# Patient Record
Sex: MALE | Race: WHITE | NOT HISPANIC OR LATINO | Employment: OTHER | URBAN - METROPOLITAN AREA
[De-identification: names, ages, dates, MRNs, and addresses within clinical notes are randomized per-mention and may not be internally consistent; named-entity substitution may affect disease eponyms.]

---

## 2023-10-06 ENCOUNTER — TELEPHONE (OUTPATIENT)
Dept: PRIMARY CARE | Facility: CLINIC | Age: 78
End: 2023-10-06

## 2023-10-13 ENCOUNTER — OFFICE VISIT (OUTPATIENT)
Dept: PRIMARY CARE | Facility: CLINIC | Age: 78
End: 2023-10-13
Payer: MEDICARE

## 2023-10-13 DIAGNOSIS — Z00.00 MEDICARE ANNUAL WELLNESS VISIT, SUBSEQUENT: Primary | ICD-10-CM

## 2023-10-13 DIAGNOSIS — M54.32 SCIATICA OF LEFT SIDE: ICD-10-CM

## 2023-10-13 DIAGNOSIS — R53.83 FATIGUE, UNSPECIFIED TYPE: ICD-10-CM

## 2023-10-13 DIAGNOSIS — E78.2 MIXED HYPERLIPIDEMIA: ICD-10-CM

## 2023-10-13 DIAGNOSIS — I73.9 INTERMITTENT CLAUDICATION (CMS-HCC): ICD-10-CM

## 2023-10-13 DIAGNOSIS — J42 CHRONIC BRONCHITIS, UNSPECIFIED CHRONIC BRONCHITIS TYPE (MULTI): ICD-10-CM

## 2023-10-13 DIAGNOSIS — Z12.5 PROSTATE CANCER SCREENING: ICD-10-CM

## 2023-10-13 PROCEDURE — G0439 PPPS, SUBSEQ VISIT: HCPCS | Performed by: INTERNAL MEDICINE

## 2023-10-13 PROCEDURE — 99397 PER PM REEVAL EST PAT 65+ YR: CPT | Performed by: INTERNAL MEDICINE

## 2023-10-13 PROCEDURE — 1159F MED LIST DOCD IN RCRD: CPT | Performed by: INTERNAL MEDICINE

## 2023-10-13 PROCEDURE — 1160F RVW MEDS BY RX/DR IN RCRD: CPT | Performed by: INTERNAL MEDICINE

## 2023-10-13 RX ORDER — ATORVASTATIN CALCIUM 80 MG/1
40 TABLET, FILM COATED ORAL NIGHTLY
COMMUNITY
Start: 2021-02-01 | End: 2024-01-08 | Stop reason: WASHOUT

## 2023-10-13 RX ORDER — ATORVASTATIN CALCIUM 40 MG/1
40 TABLET, FILM COATED ORAL DAILY
COMMUNITY
Start: 2023-09-15 | End: 2024-02-21 | Stop reason: SDUPTHER

## 2023-10-13 RX ORDER — GABAPENTIN 300 MG/1
300 CAPSULE ORAL 2 TIMES DAILY
COMMUNITY
Start: 2019-09-09 | End: 2024-02-07 | Stop reason: SDUPTHER

## 2023-10-13 RX ORDER — TRAZODONE HYDROCHLORIDE 50 MG/1
50 TABLET ORAL NIGHTLY
COMMUNITY
Start: 2023-04-04 | End: 2024-02-21 | Stop reason: WASHOUT

## 2023-10-13 RX ORDER — PREDNISONE 20 MG/1
40 TABLET ORAL DAILY
Qty: 10 TABLET | Refills: 0 | Status: SHIPPED | OUTPATIENT
Start: 2023-10-13 | End: 2023-10-18

## 2023-10-17 ENCOUNTER — OFFICE VISIT (OUTPATIENT)
Dept: OTOLARYNGOLOGY | Facility: CLINIC | Age: 78
End: 2023-10-17
Payer: MEDICARE

## 2023-10-17 ENCOUNTER — CLINICAL SUPPORT (OUTPATIENT)
Dept: AUDIOLOGY | Facility: CLINIC | Age: 78
End: 2023-10-17
Payer: MEDICARE

## 2023-10-17 VITALS — BODY MASS INDEX: 25.48 KG/M2 | WEIGHT: 182 LBS | HEIGHT: 71 IN

## 2023-10-17 DIAGNOSIS — H90.3 BILATERAL SENSORINEURAL HEARING LOSS: Primary | ICD-10-CM

## 2023-10-17 DIAGNOSIS — J31.0 CHRONIC RHINITIS: ICD-10-CM

## 2023-10-17 PROCEDURE — 92557 COMPREHENSIVE HEARING TEST: CPT | Performed by: AUDIOLOGIST

## 2023-10-17 PROCEDURE — 99203 OFFICE O/P NEW LOW 30 MIN: CPT | Performed by: OTOLARYNGOLOGY

## 2023-10-17 PROCEDURE — 1159F MED LIST DOCD IN RCRD: CPT | Performed by: OTOLARYNGOLOGY

## 2023-10-17 PROCEDURE — 92567 TYMPANOMETRY: CPT | Performed by: AUDIOLOGIST

## 2023-10-17 PROCEDURE — 1160F RVW MEDS BY RX/DR IN RCRD: CPT | Performed by: OTOLARYNGOLOGY

## 2023-10-17 NOTE — PROGRESS NOTES
Mr. Mckeon was seen today for a hearing evaluation during Dr. Sevilla's clinic.  He has a history of bilateral hearing loss which he reports has been worsening over the last 20-30 years.  He also has a history of occupational noise exposure secondary to his work as a .   He reports significant difficulty understanding speech.    Results:  Otoscopy revealed clear ear canals and tympanic membranes were visualized bilaterally.  Tympanometry revealed normal, Type A tympanograms, indicating normal ear canal volume, peak pressure and compliance bilaterally.   Audiometric thresholds revealed a slight sloping to profound sensorineural hearing loss in his left ear and a slight sloping to severe sensorineural hearing loss in his right ear.  Word recognition scores were fair at 64% bilaterally.    Recommendations:  Follow-up with PCP, Dr. Magana, as medically directed.  Follow-up with ENT, Dr. Sevilla, as medically directed.  Binaural amplification.  Retest hearing annually to monitor.

## 2023-10-17 NOTE — PROGRESS NOTES
Subjective   Patient ID: Mo Mckeon is a 78 y.o. male who presents for Hearing Loss.    HPI  Patient has a longstanding history of hearing loss and initially was considering hearing aids but told they were outlandishly expensive and at a price that he could not afford.  He has been therefore dragging his feet and avoiding them.  It is gotten to the point however he needs to do something.  Additionally, he complains of chronic rhinorrhea with evident chronic rhinitis symptomatology related to same with some congestion.  All remaining ENT inquiry is clear.      Review of Systems   All other systems reviewed and are negative.          Physical Exam  General appearance: No acute distress. Normal facies. Symmetric facial movement. No gross lesions of the face are noted.  The external ear structures appear normal. The ear canals patent and the tympanic membranes are intact without evidence of air-fluid levels, retraction, or congenital defects.  Nasal exam clear rhinorrhea noted bilaterally with mild congestion.  No gross polyps seen.  The tongue is normally mobile. There are no lesions on the gingiva, buccal, or oral mucosa. There are no oral cavity masses.  The neck is negative for mass lymphadenopathy. The trachea and parotid are clear. The thyroid bed is grossly unremarkable. The salivary gland structures are grossly unremarkable.    Assessment/Plan   1.  Bilateral sensorineural hearing loss moderately severe in nature with word discriminate 64% bilateral.  Great candidate for trial of hearing aid amplification and will work with Dr. Elmore and audiology for same.  2.  Chronic rhinitis.  We will start him on Nasacort with appropriate instruction for maximal benefit.  All questions were answered in this regard accordingly.

## 2023-10-27 VITALS
TEMPERATURE: 97.2 F | WEIGHT: 182 LBS | RESPIRATION RATE: 16 BRPM | HEART RATE: 74 BPM | BODY MASS INDEX: 25.38 KG/M2 | SYSTOLIC BLOOD PRESSURE: 126 MMHG | DIASTOLIC BLOOD PRESSURE: 78 MMHG

## 2023-10-27 PROBLEM — I73.9 INTERMITTENT CLAUDICATION (CMS-HCC): Status: ACTIVE | Noted: 2023-10-27

## 2023-10-27 PROBLEM — J42 CHRONIC BRONCHITIS, UNSPECIFIED CHRONIC BRONCHITIS TYPE (MULTI): Status: ACTIVE | Noted: 2023-10-27

## 2023-10-27 ASSESSMENT — ENCOUNTER SYMPTOMS
GASTROINTESTINAL NEGATIVE: 1
CONSTITUTIONAL NEGATIVE: 1
NEUROLOGICAL NEGATIVE: 1
BACK PAIN: 1
CARDIOVASCULAR NEGATIVE: 1
HEMATOLOGIC/LYMPHATIC NEGATIVE: 1
ENDOCRINE NEGATIVE: 1
DEPRESSION: 0
OCCASIONAL FEELINGS OF UNSTEADINESS: 0
LOSS OF SENSATION IN FEET: 0

## 2023-10-27 NOTE — PROGRESS NOTES
Subjective   Reason for Visit: Mo Mckeon is an 78 y.o. male here for a Medicare Wellness visit.               HPI patient is here for wellness exam he has chronic lumbar radiculopathy with periodic exacerbation of back pain he continues to smoke we have advised him about annual LDCT's for lung cancer screening.    Patient Care Team:  Yosi Allen MD as PCP - General  Yosi Allen MD as PCP - [employer] PCP     Review of Systems   Constitutional: Negative.    HENT:  Positive for hearing loss.    Respiratory:          COPD   Cardiovascular: Negative.    Gastrointestinal: Negative.    Endocrine: Negative.    Genitourinary: Negative.    Musculoskeletal:  Positive for back pain.   Neurological: Negative.    Hematological: Negative.    All other systems reviewed and are negative.      Objective   Vitals:  /78   Pulse 74   Temp 36.2 °C (97.2 °F)   Resp 16   Wt 82.6 kg (182 lb)   BMI 25.38 kg/m²       Physical Exam  Vitals reviewed.   Constitutional:       Appearance: Normal appearance.   HENT:      Head: Normocephalic and atraumatic.   Neurological:      Mental Status: He is alert.         Assessment/Plan   Problem List Items Addressed This Visit       Chronic bronchitis, unspecified chronic bronchitis type (CMS/HCC)    Intermittent claudication (CMS/HCC)     Other Visit Diagnoses       Medicare annual wellness visit, subsequent    -  Primary    Mixed hyperlipidemia        Relevant Orders    Cholesterol, LDL Direct    Comprehensive Metabolic Panel    Lipid Panel    Fatigue, unspecified type        Relevant Orders    CBC and Auto Differential    Prostate cancer screening        Relevant Orders    Prostate Specific Antigen    Sciatica of left side              Patient was given prednisone for 5 days for sciatica of the left side advised quitting smoking and he should get annual flu shot and blood work ordered.  Patient has bilateral hearing loss and has been referred to  ENT.

## 2024-01-08 PROBLEM — E78.5 HYPERLIPIDEMIA: Status: ACTIVE | Noted: 2024-01-08

## 2024-01-08 PROBLEM — Z98.61 CAD S/P PERCUTANEOUS CORONARY ANGIOPLASTY: Status: ACTIVE | Noted: 2024-01-08

## 2024-01-08 PROBLEM — F17.200 CURRENT EVERY DAY SMOKER: Status: ACTIVE | Noted: 2024-01-08

## 2024-01-08 PROBLEM — R73.9 HYPERGLYCEMIA: Status: ACTIVE | Noted: 2024-01-08

## 2024-01-08 PROBLEM — J44.9 COPD (CHRONIC OBSTRUCTIVE PULMONARY DISEASE) (MULTI): Status: ACTIVE | Noted: 2024-01-08

## 2024-01-08 PROBLEM — I10 HTN (HYPERTENSION): Status: ACTIVE | Noted: 2024-01-08

## 2024-01-08 PROBLEM — I25.10 CAD S/P PERCUTANEOUS CORONARY ANGIOPLASTY: Status: ACTIVE | Noted: 2024-01-08

## 2024-01-08 PROBLEM — G47.33 OSA ON CPAP: Status: ACTIVE | Noted: 2024-01-08

## 2024-01-08 PROBLEM — I21.4 NSTEMI, INITIAL EPISODE OF CARE (MULTI): Status: ACTIVE | Noted: 2024-01-08

## 2024-01-08 PROBLEM — I25.5 ISCHEMIC CARDIOMYOPATHY: Status: ACTIVE | Noted: 2024-01-08

## 2024-01-08 RX ORDER — METOPROLOL TARTRATE 25 MG/1
1 TABLET, FILM COATED ORAL 2 TIMES DAILY
COMMUNITY
Start: 2021-12-29 | End: 2024-02-21 | Stop reason: WASHOUT

## 2024-01-08 RX ORDER — NITROGLYCERIN 0.4 MG/1
0.4 TABLET SUBLINGUAL EVERY 5 MIN PRN
COMMUNITY
Start: 2021-12-29

## 2024-01-08 RX ORDER — ASPIRIN 81 MG/1
1 TABLET ORAL DAILY
COMMUNITY
Start: 2021-02-01

## 2024-01-08 RX ORDER — FLUTICASONE PROPIONATE 50 MCG
1 SPRAY, SUSPENSION (ML) NASAL DAILY
COMMUNITY
Start: 2022-03-07

## 2024-02-07 DIAGNOSIS — J31.0 CHRONIC RHINITIS: ICD-10-CM

## 2024-02-08 RX ORDER — GABAPENTIN 300 MG/1
300 CAPSULE ORAL 2 TIMES DAILY
Qty: 90 CAPSULE | Refills: 1 | Status: SHIPPED | OUTPATIENT
Start: 2024-02-08

## 2024-02-21 ENCOUNTER — OFFICE VISIT (OUTPATIENT)
Dept: CARDIOLOGY | Facility: CLINIC | Age: 79
End: 2024-02-21
Payer: MEDICARE

## 2024-02-21 VITALS
WEIGHT: 187.7 LBS | SYSTOLIC BLOOD PRESSURE: 136 MMHG | BODY MASS INDEX: 26.28 KG/M2 | HEART RATE: 56 BPM | DIASTOLIC BLOOD PRESSURE: 70 MMHG | HEIGHT: 71 IN

## 2024-02-21 DIAGNOSIS — G47.33 OSA ON CPAP: ICD-10-CM

## 2024-02-21 DIAGNOSIS — I73.9 INTERMITTENT CLAUDICATION (CMS-HCC): ICD-10-CM

## 2024-02-21 DIAGNOSIS — E78.2 MIXED HYPERLIPIDEMIA: ICD-10-CM

## 2024-02-21 DIAGNOSIS — J44.9 CHRONIC OBSTRUCTIVE PULMONARY DISEASE, UNSPECIFIED COPD TYPE (MULTI): ICD-10-CM

## 2024-02-21 DIAGNOSIS — Z98.61 CAD S/P PERCUTANEOUS CORONARY ANGIOPLASTY: ICD-10-CM

## 2024-02-21 DIAGNOSIS — F17.200 CURRENT EVERY DAY SMOKER: ICD-10-CM

## 2024-02-21 DIAGNOSIS — I10 HYPERTENSION, UNSPECIFIED TYPE: ICD-10-CM

## 2024-02-21 DIAGNOSIS — I25.10 CAD S/P PERCUTANEOUS CORONARY ANGIOPLASTY: ICD-10-CM

## 2024-02-21 DIAGNOSIS — I25.5 ISCHEMIC CARDIOMYOPATHY: ICD-10-CM

## 2024-02-21 PROCEDURE — 3078F DIAST BP <80 MM HG: CPT | Performed by: INTERNAL MEDICINE

## 2024-02-21 PROCEDURE — 1159F MED LIST DOCD IN RCRD: CPT | Performed by: INTERNAL MEDICINE

## 2024-02-21 PROCEDURE — 99214 OFFICE O/P EST MOD 30 MIN: CPT | Performed by: INTERNAL MEDICINE

## 2024-02-21 PROCEDURE — 3075F SYST BP GE 130 - 139MM HG: CPT | Performed by: INTERNAL MEDICINE

## 2024-02-21 RX ORDER — ATORVASTATIN CALCIUM 40 MG/1
40 TABLET, FILM COATED ORAL DAILY
Qty: 90 TABLET | Refills: 3 | Status: SHIPPED | OUTPATIENT
Start: 2024-02-21

## 2024-02-21 RX ORDER — METOPROLOL SUCCINATE 25 MG/1
25 TABLET, EXTENDED RELEASE ORAL DAILY
Qty: 90 TABLET | Refills: 3 | Status: SHIPPED | OUTPATIENT
Start: 2024-02-21

## 2024-02-21 RX ORDER — METOPROLOL SUCCINATE 25 MG/1
25 TABLET, EXTENDED RELEASE ORAL DAILY
COMMUNITY
Start: 2023-11-12 | End: 2024-02-21 | Stop reason: SDUPTHER

## 2024-02-21 NOTE — PROGRESS NOTES
CARDIOLOGY OFFICE VISIT      CHIEF COMPLAINT      HISTORY OF PRESENT ILLNESS  The patient denies chest discomfort or symptoms of myocardial ischemia.  The patient has not used nitroglycerin.  He has significant dyspnea with activity secondary to COPD and continued tobacco abuse.  He states that his breathing is probably getting worse.  He denies palpitations and syncope.  He is having some discomfort in his legs bilaterally which could represent intermittent claudication.  I told him we will get STEPHEN and TBI and call him with results.  He has not had lab work done for 2 years.  I told him we will get lab work on call him the results.  He denies any problem with his current medication.      Impression:  Coronary artery disease, no angina  Non-STEMI November 2020  PCI with HAMILTON of RCA November 2020  Ischemic cardiomyopathy left ventricular ejection fraction 45%  Hypertension  Hyperlipidemia  Overweight  Obstructive sleep apnea  COPD  Tobacco abuse     Please excuse any errors in grammar or translation related to this dictation. Voice recognition software was utilized to prepare this document.         Past Medical History  History reviewed. No pertinent past medical history.    Social History  Social History     Tobacco Use    Smoking status: Every Day     Packs/day: 1     Types: Cigarettes     Passive exposure: Never    Smokeless tobacco: Never   Substance Use Topics    Alcohol use: Never    Drug use: Yes     Types: Marijuana     Comment: occasional       Family History     Family History   Problem Relation Name Age of Onset    Heart disease Mother      Heart disease Father          Allergies:  Allergies   Allergen Reactions    Bacitracin Hives, Itching and Swelling    Lisinopril Unknown        Outpatient Medications:  Current Outpatient Medications   Medication Instructions    aspirin 81 mg EC tablet 1 tablet, oral, Daily    atorvastatin (LIPITOR) 40 mg, oral, Daily    fluticasone (Flonase) 50 mcg/actuation nasal spray  1 spray, Each Nostril, Daily    gabapentin (NEURONTIN) 300 mg, oral, 2 times daily    metoprolol succinate XL (TOPROL-XL) 25 mg, oral, Daily    nitroglycerin (NITROSTAT) 0.4 mg, sublingual, Every 5 min PRN          REVIEW OF SYSTEMS  Review of Systems   All other systems reviewed and are negative.        VITALS  Vitals:    02/21/24 1102   BP: 136/70   Pulse: 56       PHYSICAL EXAM  Constitutional:       Appearance: Healthy appearance. Not in distress.   Eyes:      Conjunctiva/sclera: Conjunctivae normal.      Pupils: Pupils are equal, round, and reactive to light.   Neck:      Vascular: No JVR. JVD normal.   Pulmonary:      Effort: Pulmonary effort is normal.      Breath sounds: Decreased breath sounds present. No wheezing. No rhonchi. No rales.   Chest:      Chest wall: Not tender to palpatation.   Cardiovascular:      PMI at left midclavicular line. Normal rate. Regular rhythm. Normal S1. Normal S2.       Murmurs: There is no murmur.      No gallop.  No click. No rub.   Pulses:     Intact distal pulses.   Edema:     Peripheral edema absent.   Abdominal:      Tenderness: There is no abdominal tenderness.   Musculoskeletal: Normal range of motion.         General: No tenderness.      Cervical back: Normal range of motion. Skin:     General: Skin is warm and dry.   Neurological:      General: No focal deficit present.      Mental Status: Alert and oriented to person, place and time.           ASSESSMENT AND PLAN  Diagnoses and all orders for this visit:  CAD S/P percutaneous coronary angioplasty  Ischemic cardiomyopathy  Hypertension, unspecified type  Mixed hyperlipidemia  Intermittent claudication (CMS/HCC)  Chronic obstructive pulmonary disease, unspecified COPD type (CMS/HCC)  KELSEA on CPAP  BMI 26.0-26.9,adult  Current every day smoker      [unfilled]

## 2024-02-21 NOTE — PATIENT INSTRUCTIONS
You will be scheduled for an STEPHEN with TBI at [facility]  Please have your lab work done in the near future    Will call patient with test results once reviewed by physician    Follow up office visit in 1 year.    Continue same medications/treatment.  Patient educated on proper medication use.  Patient educated on risk factor modification.  Please bring any lab results from other providers / physicians to your next appointment.    Please bring all medicines, vitamins and herbal supplements with you when you come to the office.    Prescriptions will not be filled unless you are compliant with your follow up appointments or have a follow up  appointment scheduled as per instruction of your physician.  Refills should be requested at the time of  Your visit.

## 2024-04-15 ENCOUNTER — HOSPITAL ENCOUNTER (OUTPATIENT)
Dept: RADIOLOGY | Facility: HOSPITAL | Age: 79
Discharge: HOME | End: 2024-04-15
Payer: MEDICARE

## 2024-04-15 DIAGNOSIS — I73.9 INTERMITTENT CLAUDICATION (CMS-HCC): ICD-10-CM

## 2024-04-15 PROCEDURE — 93922 UPR/L XTREMITY ART 2 LEVELS: CPT

## 2024-04-15 PROCEDURE — 93922 UPR/L XTREMITY ART 2 LEVELS: CPT | Performed by: SURGERY

## 2024-04-16 ENCOUNTER — TELEPHONE (OUTPATIENT)
Dept: CARDIOLOGY | Facility: CLINIC | Age: 79
End: 2024-04-16
Payer: MEDICARE

## 2024-04-16 NOTE — TELEPHONE ENCOUNTER
4-16/2024-Called and left message for pt to call back regarding recent testing done on legs.    ----- Message from Deanna Carbone LPN sent at 4/15/2024  3:44 PM EDT -----    ----- Message -----  From: Ralph Wilkes MD  Sent: 4/15/2024   3:10 PM EDT  To: Deanna Carbone LPN    ABIs demonstrate no significant PAD of the right leg, left leg there is some evidence of PAD but very distally in the left leg, medical management, patient's symptoms not due to PAD  ----- Message -----  From: Interface, Syngo - Cardiology Results In  Sent: 4/15/2024   1:24 PM EDT  To: Ralph Wilkes MD

## 2024-04-17 ENCOUNTER — OFFICE VISIT (OUTPATIENT)
Dept: PRIMARY CARE | Facility: CLINIC | Age: 79
End: 2024-04-17
Payer: MEDICARE

## 2024-04-17 VITALS
WEIGHT: 184 LBS | TEMPERATURE: 97.9 F | BODY MASS INDEX: 25.76 KG/M2 | SYSTOLIC BLOOD PRESSURE: 134 MMHG | HEIGHT: 71 IN | DIASTOLIC BLOOD PRESSURE: 60 MMHG | HEART RATE: 64 BPM

## 2024-04-17 DIAGNOSIS — Z12.5 SCREENING FOR PROSTATE CANCER: ICD-10-CM

## 2024-04-17 DIAGNOSIS — Z12.2 SCREENING FOR LUNG CANCER: ICD-10-CM

## 2024-04-17 DIAGNOSIS — Z00.00 MEDICARE ANNUAL WELLNESS VISIT, SUBSEQUENT: Primary | ICD-10-CM

## 2024-04-17 DIAGNOSIS — F17.210 CIGARETTE SMOKER: ICD-10-CM

## 2024-04-17 DIAGNOSIS — Z98.61 CAD S/P PERCUTANEOUS CORONARY ANGIOPLASTY: ICD-10-CM

## 2024-04-17 DIAGNOSIS — H90.3 BILATERAL SENSORINEURAL HEARING LOSS: ICD-10-CM

## 2024-04-17 DIAGNOSIS — Z00.00 ROUTINE GENERAL MEDICAL EXAMINATION AT HEALTH CARE FACILITY: ICD-10-CM

## 2024-04-17 DIAGNOSIS — R53.83 FATIGUE, UNSPECIFIED TYPE: ICD-10-CM

## 2024-04-17 DIAGNOSIS — I25.10 CAD S/P PERCUTANEOUS CORONARY ANGIOPLASTY: ICD-10-CM

## 2024-04-17 DIAGNOSIS — E78.2 MIXED HYPERLIPIDEMIA: ICD-10-CM

## 2024-04-17 PROCEDURE — 3075F SYST BP GE 130 - 139MM HG: CPT | Performed by: INTERNAL MEDICINE

## 2024-04-17 PROCEDURE — 1160F RVW MEDS BY RX/DR IN RCRD: CPT | Performed by: INTERNAL MEDICINE

## 2024-04-17 PROCEDURE — G0439 PPPS, SUBSEQ VISIT: HCPCS | Performed by: INTERNAL MEDICINE

## 2024-04-17 PROCEDURE — 99397 PER PM REEVAL EST PAT 65+ YR: CPT | Performed by: INTERNAL MEDICINE

## 2024-04-17 PROCEDURE — 3078F DIAST BP <80 MM HG: CPT | Performed by: INTERNAL MEDICINE

## 2024-04-17 PROCEDURE — 1159F MED LIST DOCD IN RCRD: CPT | Performed by: INTERNAL MEDICINE

## 2024-04-17 ASSESSMENT — ENCOUNTER SYMPTOMS
CONSTITUTIONAL NEGATIVE: 1
ENDOCRINE NEGATIVE: 1
FACIAL SWELLING: 1
BACK PAIN: 1
PSYCHIATRIC NEGATIVE: 1
SHORTNESS OF BREATH: 1
CARDIOVASCULAR NEGATIVE: 1
WHEEZING: 1

## 2024-04-17 NOTE — PROGRESS NOTES
"Subjective   Reason for Visit: Mo Mckeon is an 78 y.o. male here for a Medicare Wellness visit.          Reviewed all medications by prescribing practitioner or clinical pharmacist (such as prescriptions, OTCs, herbal therapies and supplements) and documented in the medical record.    HPI patient here for wellness exam he has a history of hypertension chronic back pain lumbar radiculopathy hyperlipidemia still smokes 1 pack of cigarettes daily he has a history of lung nodules we advised him to get lung CAT scan for monitoring for progression/ability other nodules    Patient Care Team:  Yosi Allen MD as PCP - General  Yosi Allen MD as PCP - [employer] PCP  Ralph Wilkes MD as Cardiologist (Cardiology)     Review of Systems   Constitutional: Negative.    HENT:  Positive for facial swelling.    Respiratory:  Positive for shortness of breath and wheezing.    Cardiovascular: Negative.    Endocrine: Negative.    Genitourinary: Negative.    Musculoskeletal:  Positive for back pain.   Skin: Negative.    Psychiatric/Behavioral: Negative.     All other systems reviewed and are negative.      Objective   Vitals:  /60   Pulse 64   Temp 36.6 °C (97.9 °F) (Temporal)   Ht 1.791 m (5' 10.5\")   Wt 83.5 kg (184 lb)   BMI 26.03 kg/m²       Physical Exam  Vitals reviewed.   Constitutional:       Appearance: Normal appearance. He is normal weight.   HENT:      Head: Normocephalic and atraumatic.   Cardiovascular:      Rate and Rhythm: Normal rate and regular rhythm.   Pulmonary:      Effort: Pulmonary effort is normal.      Breath sounds: Rales present.   Musculoskeletal:      Right lower leg: No edema.      Left lower leg: No edema.   Neurological:      General: No focal deficit present.      Mental Status: He is alert and oriented to person, place, and time.      Cranial Nerves: No cranial nerve deficit.   Psychiatric:         Mood and Affect: Mood normal.         " Thought Content: Thought content normal.         Assessment/Plan   Problem List Items Addressed This Visit       Bilateral sensorineural hearing loss    CAD S/P percutaneous coronary angioplasty    Hyperlipidemia    Relevant Orders    Comprehensive Metabolic Panel    Lipid Panel    Cholesterol, LDL Direct    Medicare annual wellness visit, subsequent - Primary     Other Visit Diagnoses       Screening for prostate cancer        Relevant Orders    Prostate Specific Antigen, Screen    Routine general medical examination at health care facility        Fatigue, unspecified type        Relevant Orders    CBC and Auto Differential    Screening for lung cancer        Relevant Orders    CT lung screening low dose    Cigarette smoker        Relevant Orders    CT lung screening low dose        Patient advised about smoking cessation advised lung CAT scan.  Continue aspirin and statin for CAD s/p PCI and hyperlipidemia.  Continue beta-blocker for CAD and hypertension.

## 2024-04-19 NOTE — TELEPHONE ENCOUNTER
4/19  Call again placed and message left on voice mail requesting return call for results and recommendations.

## 2024-05-13 ENCOUNTER — HOSPITAL ENCOUNTER (OUTPATIENT)
Dept: RADIOLOGY | Facility: HOSPITAL | Age: 79
Discharge: HOME | End: 2024-05-13
Payer: MEDICARE

## 2024-05-13 DIAGNOSIS — F17.210 CIGARETTE SMOKER: ICD-10-CM

## 2024-05-13 DIAGNOSIS — Z12.2 SCREENING FOR LUNG CANCER: ICD-10-CM

## 2024-05-13 PROCEDURE — 71271 CT THORAX LUNG CANCER SCR C-: CPT

## 2024-08-30 DIAGNOSIS — E78.2 MIXED HYPERLIPIDEMIA: ICD-10-CM

## 2024-08-30 RX ORDER — ATORVASTATIN CALCIUM 40 MG/1
40 TABLET, FILM COATED ORAL DAILY
Qty: 90 TABLET | Refills: 3 | Status: SHIPPED | OUTPATIENT
Start: 2024-08-30 | End: 2025-08-30

## 2024-08-30 NOTE — TELEPHONE ENCOUNTER
Received request for prescription refill for patient.  Patient follows with Dr. Ralph Wilkes MD     Request is for atorvastatin  Is patient currently on medication- yes     Last OV- 2/21/24  Next OV- 2/19/25    Pended for signing and sent to provider.

## 2024-10-17 ENCOUNTER — APPOINTMENT (OUTPATIENT)
Dept: PRIMARY CARE | Facility: CLINIC | Age: 79
End: 2024-10-17
Payer: MEDICARE

## 2024-10-18 ENCOUNTER — APPOINTMENT (OUTPATIENT)
Dept: PRIMARY CARE | Facility: CLINIC | Age: 79
End: 2024-10-18
Payer: MEDICARE

## 2024-10-18 VITALS
HEART RATE: 60 BPM | DIASTOLIC BLOOD PRESSURE: 84 MMHG | SYSTOLIC BLOOD PRESSURE: 138 MMHG | WEIGHT: 183 LBS | TEMPERATURE: 97.4 F | BODY MASS INDEX: 25.89 KG/M2

## 2024-10-18 DIAGNOSIS — F17.200 SMOKER: ICD-10-CM

## 2024-10-18 DIAGNOSIS — E78.2 MIXED HYPERLIPIDEMIA: ICD-10-CM

## 2024-10-18 DIAGNOSIS — R53.83 FATIGUE, UNSPECIFIED TYPE: ICD-10-CM

## 2024-10-18 DIAGNOSIS — I10 PRIMARY HYPERTENSION: Primary | ICD-10-CM

## 2024-10-18 DIAGNOSIS — F33.9 DEPRESSION, RECURRENT (CMS-HCC): ICD-10-CM

## 2024-10-18 DIAGNOSIS — Z12.5 PROSTATE CANCER SCREENING: ICD-10-CM

## 2024-10-18 PROCEDURE — 1160F RVW MEDS BY RX/DR IN RCRD: CPT | Performed by: INTERNAL MEDICINE

## 2024-10-18 PROCEDURE — 3079F DIAST BP 80-89 MM HG: CPT | Performed by: INTERNAL MEDICINE

## 2024-10-18 PROCEDURE — 1158F ADVNC CARE PLAN TLK DOCD: CPT | Performed by: INTERNAL MEDICINE

## 2024-10-18 PROCEDURE — 99214 OFFICE O/P EST MOD 30 MIN: CPT | Performed by: INTERNAL MEDICINE

## 2024-10-18 PROCEDURE — 1159F MED LIST DOCD IN RCRD: CPT | Performed by: INTERNAL MEDICINE

## 2024-10-18 PROCEDURE — 3075F SYST BP GE 130 - 139MM HG: CPT | Performed by: INTERNAL MEDICINE

## 2024-10-18 PROCEDURE — 1123F ACP DISCUSS/DSCN MKR DOCD: CPT | Performed by: INTERNAL MEDICINE

## 2024-10-18 RX ORDER — DULOXETINE 40 MG/1
40 CAPSULE, DELAYED RELEASE ORAL DAILY
Qty: 30 CAPSULE | Refills: 2 | Status: SHIPPED | OUTPATIENT
Start: 2024-10-18

## 2024-10-18 RX ORDER — LOSARTAN POTASSIUM 50 MG/1
50 TABLET ORAL DAILY
Qty: 30 TABLET | Refills: 5 | Status: SHIPPED | OUTPATIENT
Start: 2024-10-18 | End: 2025-10-18

## 2024-10-18 ASSESSMENT — PATIENT HEALTH QUESTIONNAIRE - PHQ9
1. LITTLE INTEREST OR PLEASURE IN DOING THINGS: NEARLY EVERY DAY
3. TROUBLE FALLING OR STAYING ASLEEP: NEARLY EVERY DAY
SUM OF ALL RESPONSES TO PHQ QUESTIONS 1-9: 21
6. FEELING BAD ABOUT YOURSELF - OR THAT YOU ARE A FAILURE OR HAVE LET YOURSELF OR YOUR FAMILY DOWN: NEARLY EVERY DAY
5. POOR APPETITE OR OVEREATING: NEARLY EVERY DAY
SUM OF ALL RESPONSES TO PHQ9 QUESTIONS 1 & 2: 6
7. TROUBLE CONCENTRATING ON THINGS, SUCH AS READING THE NEWSPAPER OR WATCHING TELEVISION: NEARLY EVERY DAY
4. FEELING TIRED OR HAVING LITTLE ENERGY: NEARLY EVERY DAY
10. IF YOU CHECKED OFF ANY PROBLEMS, HOW DIFFICULT HAVE THESE PROBLEMS MADE IT FOR YOU TO DO YOUR WORK, TAKE CARE OF THINGS AT HOME, OR GET ALONG WITH OTHER PEOPLE: EXTREMELY DIFFICULT
2. FEELING DOWN, DEPRESSED OR HOPELESS: NEARLY EVERY DAY
9. THOUGHTS THAT YOU WOULD BE BETTER OFF DEAD, OR OF HURTING YOURSELF: NOT AT ALL
8. MOVING OR SPEAKING SO SLOWLY THAT OTHER PEOPLE COULD HAVE NOTICED. OR THE OPPOSITE, BEING SO FIGETY OR RESTLESS THAT YOU HAVE BEEN MOVING AROUND A LOT MORE THAN USUAL: NOT AT ALL

## 2024-10-18 ASSESSMENT — ENCOUNTER SYMPTOMS
LOSS OF SENSATION IN FEET: 0
CARDIOVASCULAR NEGATIVE: 1
RESPIRATORY NEGATIVE: 1
CONSTITUTIONAL NEGATIVE: 1
OCCASIONAL FEELINGS OF UNSTEADINESS: 0
DEPRESSION: 1
GASTROINTESTINAL NEGATIVE: 1

## 2024-10-18 NOTE — PROGRESS NOTES
Subjective   Patient ID: Mo Mckeon is a 79 y.o. male who presents for Follow-up (Routine visit ).    HPI   Patient here for office visit he has chronic back pain due to spinal stenosis also has been suffering from depression still smokes history of COPD.  Review of Systems   Constitutional: Negative.    HENT: Negative.     Respiratory: Negative.     Cardiovascular: Negative.    Gastrointestinal: Negative.    Genitourinary: Negative.    All other systems reviewed and are negative.      Objective   /84   Pulse 60   Temp 36.3 °C (97.4 °F) (Temporal)   Wt 83 kg (183 lb)   BMI 25.89 kg/m²     Physical Exam  Vitals reviewed.   Constitutional:       Appearance: Normal appearance.   HENT:      Head: Normocephalic and atraumatic.   Cardiovascular:      Rate and Rhythm: Normal rate and regular rhythm.   Pulmonary:      Effort: Pulmonary effort is normal.      Breath sounds: Normal breath sounds.   Musculoskeletal:      Right lower leg: No edema.      Left lower leg: No edema.   Lymphadenopathy:      Cervical: No cervical adenopathy.   Neurological:      General: No focal deficit present.      Mental Status: He is alert and oriented to person, place, and time.      Cranial Nerves: No cranial nerve deficit.         Assessment/Plan   Problem List Items Addressed This Visit             ICD-10-CM    HTN (hypertension) - Primary I10    Relevant Medications    losartan (Cozaar) 50 mg tablet    DULoxetine 40 mg DR capsule    Hyperlipidemia E78.5    Relevant Orders    Cholesterol, LDL Direct    Comprehensive Metabolic Panel    Lipid Panel    Depression, recurrent (CMS-HCC) F33.9     Other Visit Diagnoses         Codes    Prostate cancer screening     Z12.5    Relevant Orders    Prostate Specific Antigen    Fatigue, unspecified type     R53.83    Relevant Orders    CBC and Auto Differential    Smoker     F17.200    Relevant Orders    Urinalysis with Reflex Microscopic          Patient advised to see pain management quit  smoking continue annual LDCT for lung cancer screening follow-up with pulmonary and blood pressure remains high we will add losartan to blood pressure regimen and Prabhakar blood work in a month he has been initiated on duloxetine for depression and chronic back pain.  Follow-up with us in 3 to 4 months.

## 2024-12-17 DIAGNOSIS — I10 PRIMARY HYPERTENSION: ICD-10-CM

## 2024-12-17 RX ORDER — LOSARTAN POTASSIUM 50 MG/1
50 TABLET ORAL DAILY
Qty: 30 TABLET | Refills: 5 | Status: SHIPPED | OUTPATIENT
Start: 2024-12-17 | End: 2025-12-17

## 2025-01-23 DIAGNOSIS — F33.9 DEPRESSION, RECURRENT (CMS-HCC): ICD-10-CM

## 2025-01-23 DIAGNOSIS — I10 PRIMARY HYPERTENSION: ICD-10-CM

## 2025-01-23 RX ORDER — DULOXETINE 40 MG/1
40 CAPSULE, DELAYED RELEASE ORAL DAILY
Qty: 30 CAPSULE | Refills: 0 | Status: SHIPPED | OUTPATIENT
Start: 2025-01-23

## 2025-02-19 ENCOUNTER — APPOINTMENT (OUTPATIENT)
Dept: CARDIOLOGY | Facility: CLINIC | Age: 80
End: 2025-02-19
Payer: MEDICARE

## 2025-02-19 VITALS
HEIGHT: 70 IN | SYSTOLIC BLOOD PRESSURE: 126 MMHG | HEART RATE: 64 BPM | DIASTOLIC BLOOD PRESSURE: 66 MMHG | WEIGHT: 189.1 LBS | BODY MASS INDEX: 27.07 KG/M2

## 2025-02-19 DIAGNOSIS — G47.33 OSA ON CPAP: ICD-10-CM

## 2025-02-19 DIAGNOSIS — I25.5 ISCHEMIC CARDIOMYOPATHY: ICD-10-CM

## 2025-02-19 DIAGNOSIS — I10 HYPERTENSION, UNSPECIFIED TYPE: ICD-10-CM

## 2025-02-19 DIAGNOSIS — I25.10 CAD S/P PERCUTANEOUS CORONARY ANGIOPLASTY: ICD-10-CM

## 2025-02-19 DIAGNOSIS — E78.2 MIXED HYPERLIPIDEMIA: ICD-10-CM

## 2025-02-19 DIAGNOSIS — F17.200 CURRENT EVERY DAY SMOKER: ICD-10-CM

## 2025-02-19 DIAGNOSIS — I25.2 HISTORY OF NON-ST ELEVATION MYOCARDIAL INFARCTION (NSTEMI): ICD-10-CM

## 2025-02-19 DIAGNOSIS — Z98.61 CAD S/P PERCUTANEOUS CORONARY ANGIOPLASTY: ICD-10-CM

## 2025-02-19 PROCEDURE — 1123F ACP DISCUSS/DSCN MKR DOCD: CPT | Performed by: INTERNAL MEDICINE

## 2025-02-19 PROCEDURE — 99214 OFFICE O/P EST MOD 30 MIN: CPT | Performed by: INTERNAL MEDICINE

## 2025-02-19 PROCEDURE — 3078F DIAST BP <80 MM HG: CPT | Performed by: INTERNAL MEDICINE

## 2025-02-19 PROCEDURE — 3074F SYST BP LT 130 MM HG: CPT | Performed by: INTERNAL MEDICINE

## 2025-02-19 PROCEDURE — 1159F MED LIST DOCD IN RCRD: CPT | Performed by: INTERNAL MEDICINE

## 2025-02-19 RX ORDER — NITROGLYCERIN 0.4 MG/1
0.4 TABLET SUBLINGUAL EVERY 5 MIN PRN
Qty: 25 TABLET | Refills: 5 | Status: SHIPPED | OUTPATIENT
Start: 2025-02-19

## 2025-02-19 NOTE — PATIENT INSTRUCTIONS
Follow up office visit in 1 year.  Continue same medications/treatment.  Patient educated on proper medication use.  Patient educated on risk factor modification.  Please bring any lab results from other providers / physicians to your next appointment.    Please bring all medicines, vitamins and herbal supplements with you when you come to the office.    Prescriptions will not be filled unless you are compliant with your follow up appointments or have a follow up  appointment scheduled as per instruction of your physician.  Refills should be requested at the time of  Your visit.  Deanna HALL LPN, am scribing for and in the presence of Dr. Ralph Wilkes MD, FACC

## 2025-02-19 NOTE — PROGRESS NOTES
CARDIOLOGY OFFICE VISIT      CHIEF COMPLAINT      HISTORY OF PRESENT ILLNESS  The patient states that he feels like he is doing well from a cardiac standpoint.  He denies chest discomfort or symptoms to suggest myocardial ischemia.  He has not used nitroglycerin.  He has his usual problem with significant dyspnea secondary to continued tobacco abuse and his COPD.  He states this continues to slowly get worse.  He has a lot of musculoskeletal issues also.  He denies palpitations and syncope.  He denies any problem with his current medication.  He states that he is going to have lab work done in the near future.      Impression:  Coronary artery disease, no angina  Non-STEMI November 2020  PCI with HAMILTON of RCA November 2020  Ischemic cardiomyopathy left ventricular ejection fraction 45%  Hypertension  Hyperlipidemia  Overweight  Obstructive sleep apnea  COPD  Tobacco abuse     Please excuse any errors in grammar or translation related to this dictation. Voice recognition software was utilized to prepare this document.         Past Medical History  Past Medical History:   Diagnosis Date    Coronary artery disease     Hyperlipidemia     Hypertension        Social History  Social History     Tobacco Use    Smoking status: Every Day     Current packs/day: 1.00     Types: Cigarettes     Passive exposure: Never    Smokeless tobacco: Never   Substance Use Topics    Alcohol use: Never    Drug use: Yes     Types: Marijuana     Comment: occasional       Family History     Family History   Problem Relation Name Age of Onset    Heart disease Mother      Heart disease Father          Allergies:  Allergies   Allergen Reactions    Bacitracin Hives, Itching and Swelling    Lisinopril Unknown        Outpatient Medications:  Current Outpatient Medications   Medication Instructions    aspirin 81 mg EC tablet 1 tablet, Daily    atorvastatin (LIPITOR) 40 mg, oral, Daily    DULoxetine 40 mg, oral, Daily    fluticasone (Flonase) 50  mcg/actuation nasal spray 1 spray, Daily    gabapentin (NEURONTIN) 300 mg, oral, 2 times daily    losartan (COZAAR) 50 mg, oral, Daily    metoprolol succinate XL (TOPROL-XL) 25 mg, oral, Daily    nitroglycerin (NITROSTAT) 0.4 mg, Every 5 min PRN          REVIEW OF SYSTEMS  Review of Systems   All other systems reviewed and are negative.        VITALS  Vitals:    02/19/25 1119   BP: 126/66   Pulse: 64       PHYSICAL EXAM  Constitutional:       Appearance: Healthy appearance. Not in distress.   Eyes:      Conjunctiva/sclera: Conjunctivae normal.      Pupils: Pupils are equal, round, and reactive to light.   Neck:      Vascular: No JVR. JVD normal.   Pulmonary:      Effort: Pulmonary effort is normal.      Breath sounds: Normal breath sounds. No wheezing. No rhonchi. No rales.   Chest:      Chest wall: Not tender to palpatation.   Cardiovascular:      PMI at left midclavicular line. Normal rate. Regular rhythm. Normal S1. Normal S2.       Murmurs: There is no murmur.      No gallop.  No click. No rub.   Pulses:     Intact distal pulses.   Edema:     Peripheral edema absent.   Abdominal:      Tenderness: There is no abdominal tenderness.   Musculoskeletal: Normal range of motion.         General: No tenderness.      Cervical back: Normal range of motion. Skin:     General: Skin is warm and dry.   Neurological:      General: No focal deficit present.      Mental Status: Alert and oriented to person, place and time.           ASSESSMENT AND PLAN  Diagnoses and all orders for this visit:  CAD S/P percutaneous coronary angioplasty  Ischemic cardiomyopathy  Hypertension, unspecified type  Mixed hyperlipidemia  KELSEA on CPAP  BMI 27.0-27.9,adult  Current every day smoker  History of non-ST elevation myocardial infarction (NSTEMI)      [unfilled]

## 2025-02-20 LAB
ALBUMIN SERPL-MCNC: 4.3 G/DL (ref 3.6–5.1)
ALP SERPL-CCNC: 50 U/L (ref 35–144)
ALT SERPL-CCNC: 24 U/L (ref 9–46)
ANION GAP SERPL CALCULATED.4IONS-SCNC: 8 MMOL/L (CALC) (ref 7–17)
APPEARANCE UR: CLEAR
AST SERPL-CCNC: 30 U/L (ref 10–35)
BASOPHILS # BLD AUTO: 46 CELLS/UL (ref 0–200)
BASOPHILS NFR BLD AUTO: 0.4 %
BILIRUB SERPL-MCNC: 0.4 MG/DL (ref 0.2–1.2)
BILIRUB UR QL STRIP: NEGATIVE
BUN SERPL-MCNC: 10 MG/DL (ref 7–25)
CALCIUM SERPL-MCNC: 9.1 MG/DL (ref 8.6–10.3)
CHLORIDE SERPL-SCNC: 99 MMOL/L (ref 98–110)
CHOLEST SERPL-MCNC: 103 MG/DL
CHOLEST/HDLC SERPL: 2.1 (CALC)
CO2 SERPL-SCNC: 31 MMOL/L (ref 20–32)
COLOR UR: YELLOW
CREAT SERPL-MCNC: 0.85 MG/DL (ref 0.7–1.28)
EGFRCR SERPLBLD CKD-EPI 2021: 89 ML/MIN/1.73M2
EOSINOPHIL # BLD AUTO: 125 CELLS/UL (ref 15–500)
EOSINOPHIL NFR BLD AUTO: 1.1 %
ERYTHROCYTE [DISTWIDTH] IN BLOOD BY AUTOMATED COUNT: 13.1 % (ref 11–15)
GLUCOSE SERPL-MCNC: 126 MG/DL (ref 65–139)
GLUCOSE UR QL STRIP: ABNORMAL
HCT VFR BLD AUTO: 45.5 % (ref 38.5–50)
HDLC SERPL-MCNC: 49 MG/DL
HGB BLD-MCNC: 14.8 G/DL (ref 13.2–17.1)
HGB UR QL STRIP: NEGATIVE
KETONES UR QL STRIP: NEGATIVE
LDLC SERPL CALC-MCNC: 36 MG/DL (CALC)
LDLC SERPL DIRECT ASSAY-MCNC: 47 MG/DL
LEUKOCYTE ESTERASE UR QL STRIP: NEGATIVE
LYMPHOCYTES # BLD AUTO: 878 CELLS/UL (ref 850–3900)
LYMPHOCYTES NFR BLD AUTO: 7.7 %
MCH RBC QN AUTO: 31.1 PG (ref 27–33)
MCHC RBC AUTO-ENTMCNC: 32.5 G/DL (ref 32–36)
MCV RBC AUTO: 95.6 FL (ref 80–100)
MONOCYTES # BLD AUTO: 878 CELLS/UL (ref 200–950)
MONOCYTES NFR BLD AUTO: 7.7 %
NEUTROPHILS # BLD AUTO: 9473 CELLS/UL (ref 1500–7800)
NEUTROPHILS NFR BLD AUTO: 83.1 %
NITRITE UR QL STRIP: NEGATIVE
NONHDLC SERPL-MCNC: 54 MG/DL (CALC)
PH UR STRIP: 6.5 [PH] (ref 5–8)
PLATELET # BLD AUTO: 195 THOUSAND/UL (ref 140–400)
PMV BLD REES-ECKER: 11.8 FL (ref 7.5–12.5)
POTASSIUM SERPL-SCNC: 4.4 MMOL/L (ref 3.5–5.3)
PROT SERPL-MCNC: 6.1 G/DL (ref 6.1–8.1)
PROT UR QL STRIP: NEGATIVE
PSA SERPL-MCNC: 1.63 NG/ML
RBC # BLD AUTO: 4.76 MILLION/UL (ref 4.2–5.8)
SODIUM SERPL-SCNC: 138 MMOL/L (ref 135–146)
SP GR UR STRIP: 1.01 (ref 1–1.03)
TRIGL SERPL-MCNC: 94 MG/DL
WBC # BLD AUTO: 11.4 THOUSAND/UL (ref 3.8–10.8)

## 2025-03-15 DIAGNOSIS — J31.0 CHRONIC RHINITIS: ICD-10-CM

## 2025-03-17 DIAGNOSIS — I10 HYPERTENSION, UNSPECIFIED TYPE: ICD-10-CM

## 2025-03-17 DIAGNOSIS — Z98.61 CAD S/P PERCUTANEOUS CORONARY ANGIOPLASTY: ICD-10-CM

## 2025-03-17 DIAGNOSIS — I25.10 CAD S/P PERCUTANEOUS CORONARY ANGIOPLASTY: ICD-10-CM

## 2025-03-17 RX ORDER — GABAPENTIN 300 MG/1
300 CAPSULE ORAL 2 TIMES DAILY
Qty: 90 CAPSULE | Refills: 1 | Status: SHIPPED | OUTPATIENT
Start: 2025-03-17

## 2025-03-17 NOTE — TELEPHONE ENCOUNTER
Received request for prescription refills for patient.   Patient follows with Dr. Ralph Wilkes     Request is for Metoprolol succinate  Is patient currently on medication yes    Last OV 2/19/25  Next OV 2/18/26    Pended for signing and sent to provider

## 2025-03-18 RX ORDER — METOPROLOL SUCCINATE 25 MG/1
25 TABLET, EXTENDED RELEASE ORAL DAILY
Qty: 90 TABLET | Refills: 3 | Status: SHIPPED | OUTPATIENT
Start: 2025-03-18

## 2025-03-26 DIAGNOSIS — M54.32 SCIATICA OF LEFT SIDE: Primary | ICD-10-CM

## 2025-03-26 DIAGNOSIS — J31.0 CHRONIC RHINITIS: ICD-10-CM

## 2025-03-26 RX ORDER — GABAPENTIN 300 MG/1
300 CAPSULE ORAL 2 TIMES DAILY
Qty: 90 CAPSULE | Refills: 1 | Status: SHIPPED | OUTPATIENT
Start: 2025-03-26

## 2025-04-10 ENCOUNTER — TELEPHONE (OUTPATIENT)
Dept: PRIMARY CARE | Facility: CLINIC | Age: 80
End: 2025-04-10
Payer: MEDICARE

## 2025-04-18 ENCOUNTER — APPOINTMENT (OUTPATIENT)
Dept: PRIMARY CARE | Facility: CLINIC | Age: 80
End: 2025-04-18
Payer: MEDICARE

## 2025-04-18 VITALS
HEART RATE: 72 BPM | DIASTOLIC BLOOD PRESSURE: 68 MMHG | WEIGHT: 192 LBS | BODY MASS INDEX: 27.49 KG/M2 | TEMPERATURE: 97.8 F | SYSTOLIC BLOOD PRESSURE: 138 MMHG | HEIGHT: 70 IN

## 2025-04-18 DIAGNOSIS — J42 CHRONIC BRONCHITIS, UNSPECIFIED CHRONIC BRONCHITIS TYPE (MULTI): ICD-10-CM

## 2025-04-18 DIAGNOSIS — Z00.00 ROUTINE GENERAL MEDICAL EXAMINATION AT HEALTH CARE FACILITY: Primary | ICD-10-CM

## 2025-04-18 DIAGNOSIS — Z00.00 MEDICARE ANNUAL WELLNESS VISIT, SUBSEQUENT: ICD-10-CM

## 2025-04-18 DIAGNOSIS — J44.9 CHRONIC OBSTRUCTIVE PULMONARY DISEASE, UNSPECIFIED COPD TYPE (MULTI): ICD-10-CM

## 2025-04-18 PROCEDURE — 99397 PER PM REEVAL EST PAT 65+ YR: CPT | Performed by: INTERNAL MEDICINE

## 2025-04-18 PROCEDURE — 1159F MED LIST DOCD IN RCRD: CPT | Performed by: INTERNAL MEDICINE

## 2025-04-18 PROCEDURE — 1123F ACP DISCUSS/DSCN MKR DOCD: CPT | Performed by: INTERNAL MEDICINE

## 2025-04-18 PROCEDURE — 3075F SYST BP GE 130 - 139MM HG: CPT | Performed by: INTERNAL MEDICINE

## 2025-04-18 PROCEDURE — G0439 PPPS, SUBSEQ VISIT: HCPCS | Performed by: INTERNAL MEDICINE

## 2025-04-18 PROCEDURE — 3078F DIAST BP <80 MM HG: CPT | Performed by: INTERNAL MEDICINE

## 2025-04-18 PROCEDURE — 1160F RVW MEDS BY RX/DR IN RCRD: CPT | Performed by: INTERNAL MEDICINE

## 2025-04-18 ASSESSMENT — ENCOUNTER SYMPTOMS
HEMATOLOGIC/LYMPHATIC NEGATIVE: 1
NEUROLOGICAL NEGATIVE: 1
GASTROINTESTINAL NEGATIVE: 1
CONSTITUTIONAL NEGATIVE: 1
ALLERGIC/IMMUNOLOGIC NEGATIVE: 1
EYES NEGATIVE: 1
BACK PAIN: 1
ENDOCRINE NEGATIVE: 1
CARDIOVASCULAR NEGATIVE: 1
PSYCHIATRIC NEGATIVE: 1

## 2025-04-18 ASSESSMENT — PATIENT HEALTH QUESTIONNAIRE - PHQ9
SUM OF ALL RESPONSES TO PHQ9 QUESTIONS 1 & 2: 0
1. LITTLE INTEREST OR PLEASURE IN DOING THINGS: NOT AT ALL
2. FEELING DOWN, DEPRESSED OR HOPELESS: NOT AT ALL

## 2025-04-18 NOTE — ASSESSMENT & PLAN NOTE
Advised and discussed smoking cessation he is not receptive or ready at this point.  Lab results discussed with patient.  He will continue losartan and metoprolol for hypertension continue aspirin and statin for CAD along with beta-blocker.  Follow-up with us in 4 to 6 months.

## 2025-04-18 NOTE — PROGRESS NOTES
"Subjective   Reason for Visit: Mo Mckeon is an 79 y.o. male here for a Medicare Wellness visit.               Patient here for Medicare wellness.  Still smokes 1 pack of cigarettes daily.  He should continue LDCT for a lung cancer screening on an annual basis.  He is currently on palliative care.  She has history of CAD and stents and follows up with cardiology and is on aspirin and statin.        Patient Care Team:  Yosi Allen MD as PCP - General  Yosi Allen MD as PCP - [employer] PCP  Ralph Wilkes MD as Cardiologist (Interventional Cardiology)     Review of Systems   Constitutional: Negative.    HENT: Negative.     Eyes: Negative.    Cardiovascular: Negative.    Gastrointestinal: Negative.    Endocrine: Negative.    Genitourinary: Negative.    Musculoskeletal:  Positive for back pain.   Skin: Negative.    Allergic/Immunologic: Negative.    Neurological: Negative.    Hematological: Negative.    Psychiatric/Behavioral: Negative.     All other systems reviewed and are negative.      Objective   Vitals:  /68   Pulse 72   Temp 36.6 °C (97.8 °F) (Temporal)   Ht 1.778 m (5' 10\")   Wt 87.1 kg (192 lb)   BMI 27.55 kg/m²       Physical Exam  Vitals reviewed.   Constitutional:       Appearance: Normal appearance.   HENT:      Head: Normocephalic and atraumatic.   Neck:      Vascular: No carotid bruit.   Cardiovascular:      Rate and Rhythm: Normal rate and regular rhythm.      Pulses: Normal pulses.   Pulmonary:      Effort: Pulmonary effort is normal.      Breath sounds: Normal breath sounds.   Abdominal:      Palpations: Abdomen is soft.   Musculoskeletal:      Right lower leg: No edema.      Left lower leg: No edema.   Neurological:      General: No focal deficit present.      Mental Status: He is alert and oriented to person, place, and time. Mental status is at baseline.       Assessment & Plan  Routine general medical examination at [facility] [facility]         Chronic bronchitis, unspecified chronic bronchitis type (Multi)         Chronic obstructive pulmonary disease, unspecified COPD type (Multi)         Medicare annual wellness visit, subsequent  Advised and discussed smoking cessation he is not receptive or ready at this point.  Lab results discussed with patient.  He will continue losartan and metoprolol for hypertension continue aspirin and statin for CAD along with beta-blocker.  Follow-up with us in 4 to 6 months.

## 2025-04-24 DIAGNOSIS — Z12.2 SCREENING FOR LUNG CANCER: ICD-10-CM

## 2025-04-24 DIAGNOSIS — F17.210 CIGARETTE SMOKER: ICD-10-CM

## 2025-04-28 DIAGNOSIS — I10 PRIMARY HYPERTENSION: ICD-10-CM

## 2025-04-28 RX ORDER — LOSARTAN POTASSIUM 50 MG/1
50 TABLET ORAL DAILY
Qty: 30 TABLET | Refills: 5 | Status: SHIPPED | OUTPATIENT
Start: 2025-04-28 | End: 2026-04-28

## 2025-04-28 NOTE — TELEPHONE ENCOUNTER
Received request for prescription refills for patient.   Patient follows with Dr. Ralph Wilkes     Request is for Losartan  Is patient currently on medication yes    Last OV 2/19/25  Next OV 5/7/25    Pended for signing and sent to provider

## 2025-05-07 ENCOUNTER — APPOINTMENT (OUTPATIENT)
Dept: CARDIOLOGY | Facility: CLINIC | Age: 80
End: 2025-05-07
Payer: MEDICARE

## 2025-05-07 VITALS
HEART RATE: 52 BPM | SYSTOLIC BLOOD PRESSURE: 136 MMHG | WEIGHT: 192.7 LBS | DIASTOLIC BLOOD PRESSURE: 68 MMHG | HEIGHT: 70 IN | BODY MASS INDEX: 27.59 KG/M2

## 2025-05-07 DIAGNOSIS — J44.9 CHRONIC OBSTRUCTIVE PULMONARY DISEASE, UNSPECIFIED COPD TYPE (MULTI): ICD-10-CM

## 2025-05-07 DIAGNOSIS — Z13.6 ENCOUNTER FOR SCREENING FOR STENOSIS OF CAROTID ARTERY: ICD-10-CM

## 2025-05-07 DIAGNOSIS — E78.2 MIXED HYPERLIPIDEMIA: ICD-10-CM

## 2025-05-07 DIAGNOSIS — I10 HYPERTENSION, UNSPECIFIED TYPE: ICD-10-CM

## 2025-05-07 DIAGNOSIS — I25.10 CAD S/P PERCUTANEOUS CORONARY ANGIOPLASTY: ICD-10-CM

## 2025-05-07 DIAGNOSIS — R09.89 CAROTID BRUIT, UNSPECIFIED LATERALITY: Primary | ICD-10-CM

## 2025-05-07 DIAGNOSIS — F17.200 CURRENT EVERY DAY SMOKER: ICD-10-CM

## 2025-05-07 DIAGNOSIS — Z98.61 CAD S/P PERCUTANEOUS CORONARY ANGIOPLASTY: ICD-10-CM

## 2025-05-07 DIAGNOSIS — G47.33 OSA ON CPAP: ICD-10-CM

## 2025-05-07 DIAGNOSIS — I25.5 ISCHEMIC CARDIOMYOPATHY: ICD-10-CM

## 2025-05-07 DIAGNOSIS — I25.2 HISTORY OF NON-ST ELEVATION MYOCARDIAL INFARCTION (NSTEMI): ICD-10-CM

## 2025-05-07 PROBLEM — I21.4 NSTEMI, INITIAL EPISODE OF CARE (MULTI): Status: RESOLVED | Noted: 2024-01-08 | Resolved: 2025-05-07

## 2025-05-07 PROCEDURE — 1159F MED LIST DOCD IN RCRD: CPT | Performed by: INTERNAL MEDICINE

## 2025-05-07 PROCEDURE — 3075F SYST BP GE 130 - 139MM HG: CPT | Performed by: INTERNAL MEDICINE

## 2025-05-07 PROCEDURE — 3078F DIAST BP <80 MM HG: CPT | Performed by: INTERNAL MEDICINE

## 2025-05-07 PROCEDURE — 99214 OFFICE O/P EST MOD 30 MIN: CPT | Performed by: INTERNAL MEDICINE

## 2025-05-07 ASSESSMENT — ENCOUNTER SYMPTOMS: DIZZINESS: 1

## 2025-05-07 NOTE — PATIENT INSTRUCTIONS
Patient to keep follow up in February with Dr. Ralph Ha MD      Office will arrange Carotid Duplex in near future and call with results.     Encouraged to quit smoking- heart healthy education given today.    Continue same medications and treatments.   Patient educated on proper medication use.   Patient educated on risk factor modification.   Please bring any lab results from other providers / physicians to your next appointment.     Please bring all medicines, vitamins, and herbal supplements with you when you come to the office.     Prescriptions will not be filled unless you are compliant with your follow up appointments or have a follow up appointment scheduled as per instruction of your physician. Refills should be requested at the time of your visit.    ISteve RN am scribing for and in the presence of Dr. Ralph Wilkes MD

## 2025-05-07 NOTE — PROGRESS NOTES
CARDIOLOGY OFFICE VISIT      CHIEF COMPLAINT  Chief Complaint   Patient presents with    Follow-up     Family physician would like patient to have a carotid artery ultrasound        HISTORY OF PRESENT ILLNESS  The patient states he has been feeling well from a cardiac standpoint.  He denies chest discomfort.  He has his usual chronic dyspnea secondary to his COPD.  He continues to smoke cigarettes.  He denies palpitations and syncope.  He denies symptoms of transient ischemic neurologic attacks.  He states that his family physician heard a carotid bruit recently.  I told him we will obtain a carotid ultrasound and call him with results.      Impression:  Coronary artery disease, no angina  Non-STEMI November 2020  PCI with H[facility]ILTON of RCA November 2020  Ischemic cardiomyopathy left ventricular ejection fraction 45%  Hypertension  Hyperlipidemia  Overweight  Obstructive sleep apnea  COPD  Tobacco abuse     Please excuse any errors in grammar or translation related to this dictation. Voice recognition software was utilized to prepare this document.     Past Medical History  Medical History[1]    Social History  Social History[2]    Family History   Family History[3]     Allergies:  RX Allergies[4]     Outpatient Medications:  Current Outpatient Medications   Medication Instructions    aspirin 81 mg EC tablet 1 tablet, Daily    atorvastatin (LIPITOR) 40 mg, oral, Daily    DULoxetine 40 mg, oral, Daily    fluticasone (Flonase) 50 mcg/actuation nasal spray 1 spray, Daily    gabapentin (NEURONTIN) 300 mg, oral, 2 times daily    losartan (COZAAR) 50 mg, oral, Daily    metoprolol succinate XL (TOPROL-XL) 25 mg, oral, Daily    nitroglycerin (NITROSTAT) 0.4 mg, sublingual, Every 5 min PRN          REVIEW OF SYSTEMS  Review of Systems   Neurological:  Positive for dizziness.   All other systems reviewed and are negative.        VITALS  Vitals:    05/07/25 1430   BP: 136/68   Pulse: 52       PHYSICAL EXAM  Vitals reviewed.    Constitutional:       Appearance: Normal and healthy appearance. Well-developed and not in distress.   Eyes:      Conjunctiva/sclera: Conjunctivae normal.      Pupils: Pupils are equal, round, and reactive to light.   Neck:      Vascular: Carotid bruit present. No JVR. JVD normal.   Pulmonary:      Effort: Pulmonary effort is normal.      Breath sounds: Normal breath sounds. No wheezing. No rhonchi. No rales.   Chest:      Chest wall: Not tender to palpatation.   Cardiovascular:      PMI at left midclavicular line. Normal rate. Regular rhythm. Normal S1. Normal S2.       Murmurs: There is no murmur.      No gallop.  No click. No rub.   Pulses:     Intact distal pulses.   Edema:     Peripheral edema absent.   Abdominal:      Tenderness: There is no abdominal tenderness.   Musculoskeletal: Normal range of motion.         General: No tenderness.      Cervical back: Normal range of motion. Skin:     General: Skin is warm and dry.   Neurological:      General: No focal deficit present.      Mental Status: Alert and oriented to person, place and time.   Psychiatric:         Behavior: Behavior is cooperative.           ASSESSMENT AND PLAN  Diagnoses and all orders for this visit:  CAD S/P percutaneous coronary angioplasty  Ischemic cardiomyopathy  Hypertension, unspecified type  Mixed hyperlipidemia  BMI 27.0-27.9,adult  History of non-ST elevation myocardial infarction (NSTEMI)  Chronic obstructive pulmonary disease, unspecified COPD type (Multi)  KELSEA on CPAP  Current every day smoker  Encounter for screening for stenosis of carotid artery          I,Steve Correia RN   am scribing for, and in the presence of Dr. Ralph Davis MD  .    I, Dr. Ralph Davis MD  , personally performed the services described in the documentation as scribed by Steve Correia RN   in my presence, and confirm it is both accurate and complete.      Dr. Ralph Davis MD  Thank you for allowing me to participate in the  care of this patient. Please do not hesitate to contact me with any further questions or concerns.         [1]   Past Medical History:  Diagnosis Date    Coronary artery disease     Hyperlipidemia     Hypertension    [2]   Social History  Tobacco Use    Smoking status: Every Day     Current packs/day: 1.00     Types: Cigarettes     Passive exposure: Never    Smokeless tobacco: Never   Substance Use Topics    Alcohol use: Never    Drug use: Yes     Types: Marijuana     Comment: occasional   [3]   Family History  Problem Relation Name Age of Onset    Heart disease Mother      Heart disease Father     [4]   Allergies  Allergen Reactions    Bacitracin Hives, Itching and Swelling    Lisinopril Unknown    Rubber, Unspecified Other

## 2025-05-14 ENCOUNTER — HOSPITAL ENCOUNTER (OUTPATIENT)
Dept: RADIOLOGY | Facility: HOSPITAL | Age: 80
Discharge: HOME | End: 2025-05-14
Payer: MEDICARE

## 2025-05-14 DIAGNOSIS — F17.210 CIGARETTE SMOKER: ICD-10-CM

## 2025-05-14 DIAGNOSIS — Z12.2 SCREENING FOR LUNG CANCER: ICD-10-CM

## 2025-05-14 PROCEDURE — 71271 CT THORAX LUNG CANCER SCR C-: CPT

## 2025-05-20 ENCOUNTER — HOSPITAL ENCOUNTER (OUTPATIENT)
Dept: RADIOLOGY | Facility: CLINIC | Age: 80
Discharge: HOME | End: 2025-05-20
Payer: MEDICARE

## 2025-05-20 DIAGNOSIS — E78.2 MIXED HYPERLIPIDEMIA: ICD-10-CM

## 2025-05-20 DIAGNOSIS — Z13.6 ENCOUNTER FOR SCREENING FOR STENOSIS OF CAROTID ARTERY: ICD-10-CM

## 2025-05-20 DIAGNOSIS — R09.89 CAROTID BRUIT, UNSPECIFIED LATERALITY: ICD-10-CM

## 2025-05-20 DIAGNOSIS — I10 HYPERTENSION, UNSPECIFIED TYPE: ICD-10-CM

## 2025-05-20 PROCEDURE — 93880 EXTRACRANIAL BILAT STUDY: CPT | Performed by: RADIOLOGY

## 2025-05-20 PROCEDURE — 93880 EXTRACRANIAL BILAT STUDY: CPT

## 2025-05-23 ENCOUNTER — TELEPHONE (OUTPATIENT)
Dept: CARDIOLOGY | Facility: CLINIC | Age: 80
End: 2025-05-23
Payer: MEDICARE

## 2025-05-23 NOTE — TELEPHONE ENCOUNTER
----- Message from Nurse Deanna MCNEAL sent at 5/23/2025 10:03 AM EDT -----    ----- Message -----  From: Ralph Wilkes MD  Sent: 5/22/2025   8:59 AM EDT  To: Deanna Carbone LPN    Mild carotid artery disease bilaterally  ----- Message -----  From: Interface, Radiology Results In  Sent: 5/21/2025   9:12 PM EDT  To: Ralph Wilkes MD

## 2025-06-11 DIAGNOSIS — E78.2 MIXED HYPERLIPIDEMIA: ICD-10-CM

## 2025-06-11 DIAGNOSIS — M54.32 SCIATICA OF LEFT SIDE: ICD-10-CM

## 2025-06-11 RX ORDER — ATORVASTATIN CALCIUM 40 MG/1
40 TABLET, FILM COATED ORAL DAILY
Qty: 90 TABLET | Refills: 3 | Status: SHIPPED | OUTPATIENT
Start: 2025-06-11 | End: 2026-06-11

## 2025-06-11 NOTE — TELEPHONE ENCOUNTER
Received request for prescription refills for patient.   Patient follows with Dr. Wilkes    Request is for atorvastatin  Is patient currently on medication yes    Last OV 5/7/2025  Next OV 2/18/2026    Pended for signing and sent to provider

## 2025-06-16 RX ORDER — GABAPENTIN 300 MG/1
300 CAPSULE ORAL 2 TIMES DAILY
Qty: 90 CAPSULE | Refills: 1 | Status: SHIPPED | OUTPATIENT
Start: 2025-06-16

## 2025-07-14 DIAGNOSIS — I10 PRIMARY HYPERTENSION: ICD-10-CM

## 2025-07-14 RX ORDER — LOSARTAN POTASSIUM 50 MG/1
50 TABLET ORAL DAILY
Qty: 90 TABLET | Refills: 1 | Status: SHIPPED | OUTPATIENT
Start: 2025-07-14

## 2025-07-14 NOTE — TELEPHONE ENCOUNTER
Received request for prescription refills for patient.   Patient follows with Dr. Wilkes    Request is for Losartan 50 mg  Is patient currently on medication yes    Last OV 05/07/2025  Next OV 02/18/2026Received request for prescription refills for patient.     Pended for signing and sent to provider

## 2025-08-28 ENCOUNTER — HOSPITAL ENCOUNTER (OUTPATIENT)
Dept: RESPIRATORY THERAPY | Facility: HOSPITAL | Age: 80
Discharge: HOME | End: 2025-08-28
Payer: MEDICARE

## 2025-08-28 DIAGNOSIS — R06.00 DYSPNEA, UNSPECIFIED TYPE: ICD-10-CM

## 2025-08-28 DIAGNOSIS — J44.9 CHRONIC OBSTRUCTIVE PULMONARY DISEASE, UNSPECIFIED COPD TYPE (MULTI): ICD-10-CM

## 2025-08-28 LAB
ANION GAP BLDA CALCULATED.4IONS-SCNC: 5 MMO/L (ref 10–25)
ARTERIAL PATENCY WRIST A: ABNORMAL
ARTERIAL PATENCY WRIST A: POSITIVE
BASE EXCESS BLDA CALC-SCNC: 5 MMOL/L (ref -2–3)
BODY TEMPERATURE: ABNORMAL
CA-I BLDA-SCNC: 1.23 MMOL/L (ref 1.1–1.33)
CHLORIDE BLDA-SCNC: 103 MMOL/L (ref 98–107)
GLUCOSE BLDA-MCNC: 158 MG/DL (ref 74–99)
HCO3 BLDA-SCNC: 30.5 MMOL/L (ref 22–26)
HCT VFR BLD EST: 46 % (ref 41–52)
HGB BLDA-MCNC: 15.2 G/DL (ref 13.5–17.5)
INHALED O2 CONCENTRATION: 21 %
LACTATE BLDA-SCNC: 0.9 MMOL/L (ref 0.4–2)
OXYHGB MFR BLDA: 91.2 % (ref 94–98)
PCO2 BLDA: 47 MM HG (ref 38–42)
PH BLDA: 7.42 PH (ref 7.38–7.42)
PO2 BLDA: 71 MM HG (ref 85–95)
POTASSIUM BLDA-SCNC: 4.5 MMOL/L (ref 3.5–5.3)
SAO2 % BLDA: 95 % (ref 94–100)
SITE OF ARTERIAL PUNCTURE: ABNORMAL
SODIUM BLDA-SCNC: 134 MMOL/L (ref 136–145)
SPECIMEN DRAWN FROM PATIENT: ABNORMAL

## 2025-08-28 PROCEDURE — 94726 PLETHYSMOGRAPHY LUNG VOLUMES: CPT

## 2025-08-28 PROCEDURE — 94640 AIRWAY INHALATION TREATMENT: CPT

## 2025-08-28 PROCEDURE — 84132 ASSAY OF SERUM POTASSIUM: CPT | Performed by: INTERNAL MEDICINE

## 2025-08-28 PROCEDURE — 94618 PULMONARY STRESS TESTING: CPT

## 2025-08-28 PROCEDURE — 36600 WITHDRAWAL OF ARTERIAL BLOOD: CPT

## 2025-08-28 PROCEDURE — 2500000002 HC RX 250 W HCPCS SELF ADMINISTERED DRUGS (ALT 637 FOR MEDICARE OP, ALT 636 FOR OP/ED): Performed by: INTERNAL MEDICINE

## 2025-08-28 RX ORDER — ALBUTEROL SULFATE 90 UG/1
4 INHALANT RESPIRATORY (INHALATION) ONCE
Status: COMPLETED | OUTPATIENT
Start: 2025-08-28 | End: 2025-08-28

## 2025-08-28 RX ORDER — ALBUTEROL SULFATE 0.83 MG/ML
3 SOLUTION RESPIRATORY (INHALATION) ONCE
Status: COMPLETED | OUTPATIENT
Start: 2025-08-28 | End: 2025-08-28

## 2025-08-28 RX ADMIN — ALBUTEROL SULFATE 3 ML: 2.5 SOLUTION RESPIRATORY (INHALATION) at 09:04

## 2025-09-07 LAB
MGC ASCENT PFT - FEV1 - POST: 1.71
MGC ASCENT PFT - FEV1 - PRE: 1.43
MGC ASCENT PFT - FEV1 - PREDICTED: 2.84
MGC ASCENT PFT - FVC - POST: 3.46
MGC ASCENT PFT - FVC - PRE: 2.87
MGC ASCENT PFT - FVC - PREDICTED: 3.84

## 2025-10-17 ENCOUNTER — APPOINTMENT (OUTPATIENT)
Dept: PRIMARY CARE | Facility: CLINIC | Age: 80
End: 2025-10-17
Payer: MEDICARE

## 2026-02-18 ENCOUNTER — APPOINTMENT (OUTPATIENT)
Dept: CARDIOLOGY | Facility: CLINIC | Age: 81
End: 2026-02-18
Payer: MEDICARE